# Patient Record
Sex: MALE | Race: WHITE
[De-identification: names, ages, dates, MRNs, and addresses within clinical notes are randomized per-mention and may not be internally consistent; named-entity substitution may affect disease eponyms.]

---

## 2020-02-04 ENCOUNTER — HOSPITAL ENCOUNTER (EMERGENCY)
Dept: HOSPITAL 74 - JERFT | Age: 30
Discharge: HOME | End: 2020-02-04
Payer: COMMERCIAL

## 2020-02-04 VITALS — TEMPERATURE: 97.8 F | SYSTOLIC BLOOD PRESSURE: 148 MMHG | HEART RATE: 51 BPM | DIASTOLIC BLOOD PRESSURE: 86 MMHG

## 2020-02-04 VITALS — BODY MASS INDEX: 36.2 KG/M2

## 2020-02-04 DIAGNOSIS — K08.89: Primary | ICD-10-CM

## 2020-02-04 DIAGNOSIS — K02.9: ICD-10-CM

## 2020-02-04 NOTE — PDOC
Rapid Medical Evaluation


Chief Complaint: Toothache


Time Seen by Provider: 02/04/20 15:02


Medical Evaluation: 


 Allergies











Allergy/AdvReac Type Severity Reaction Status Date / Time


 


No Known Allergies Allergy   Verified 02/04/20 14:59








Vital Signs











Temp Pulse Resp BP Pulse Ox


 


 97.8 F   51 L  14   148/86   98 


 


 02/04/20 15:00  02/04/20 15:00  02/04/20 15:00  02/04/20 15:00  02/04/20 15:00








02/04/20 15:03


Pt c/o: [place] with pain, no meds taken, needs to see a dentist


Pt on brief exam: + caries, no abscess


Pt ordered for: none


pt to proceed to the ED





**Discharge Disposition





- Diagnosis


 Tooth pain








- Discharge Dispostion


Disposition: HOME


Condition at time of disposition: Stable





- Prescriptions


Prescriptions: 


Amoxicillin - [Amoxicillin 500mg Capsule -] 500 mg PO TID #21 capsule


Ibuprofen [Motrin -] 600 mg PO TID #30 tablet


Ibuprofen [Motrin -] 600 mg PO TID #30 tablet





- Referrals


[facility]: 


[facility]





- Patient Instructions


Additional Instructions: 


Without fail follow-up with urgent care dental in 1 to 2 days for further 

evaluation and treatment options.  You may take Tylenol on top of the Motrin 

that I have prescribed for you.  The antibiotic is necessary to prevent 

infection.  Without fail follow-up with urgent care dental in 1 to 2 days and 

return to the emergency room should symptoms worsen.





- Post Discharge Activity

## 2020-02-04 NOTE — PDOC
History of Present Illness





- General


Chief Complaint: Toothache


Stated Complaint: TOOTHACHE


Time Seen by Provider: 02/04/20 15:02





- History of Present Illness


Initial Comments: 





02/04/20 17:09


29-year-old male without comorbidities presents for evaluation of toothache x4 

days





Past History





- Past Medical History


Allergies/Adverse Reactions: 


 Allergies











Allergy/AdvReac Type Severity Reaction Status Date / Time


 


No Known Allergies Allergy   Verified 02/04/20 14:59











Home Medications: 


Ambulatory Orders





Ibuprofen [Motrin -] 600 mg PO TID #30 tablet 02/04/20 


NK [No Known Home Medication]  02/04/20 








COPD: No





- Psycho Social/Smoking Cessation Hx


Smoking History: Never smoked


Have you smoked in the past 12 months: No


Number of Cigarettes Smoked Daily: 0


Hx Alcohol Use: No


Drug/Substance Use Hx: No


Substance Use Type: None





**Review of Systems





- Review of Systems


Constitutional: No: Fever


HEENTM: Yes: Dental Problems





*Physical Exam





- Vital Signs


 Last Vital Signs











Temp Pulse Resp BP Pulse Ox


 


 97.8 F   51 L  14   148/86   98 


 


 02/04/20 15:00  02/04/20 15:00  02/04/20 15:00  02/04/20 15:00  02/04/20 15:00














- Physical Exam





02/04/20 17:09


GENERAL: The patient is awake, alert, and fully oriented, in no acute distress.


HEAD: Normal with no signs of trauma.


EYES:  sclera anicteric, conjunctiva clear.


ENT: Ears normal tympanic membranes normal oropharynx clear uvula midline


NECK: Normal range of motion


LUNGS: Breath sounds equal, clear to auscultation bilaterally.  No wheezes, and 

no crackles.


HEART: S1 and S2 without murmur, rub or gallop.


ABDOMEN: Soft, nontender, normoactive bowel sounds.  No guarding, no rebound.  

No masses.


EXTREMITIES: Normal range of motion, no edema.  No clubbing or cyanosis. No 

cords, erythema, or tenderness.


NEUROLOGICAL: Cranial nerves II through XII grossly intact.


PSYCH: Normal mood, normal affect.


SKIN: Warm, Dry, normal turgor, no rashes or lesions noted.





Widespread dental decay left anterior incisors and premolars are decayed and 

torn down no areas of focal fluctuance





Medical Decision Making





- Medical Decision Making





02/04/20 17:10


Amoxicillin follow-up with urgent care dental





Discharge





- Discharge Information


Problems reviewed: Yes


Clinical Impression/Diagnosis: 


 Tooth pain





Condition: Stable


Disposition: HOME





- Admission


No





- Additional Discharge Information


Prescriptions: 


Amoxicillin - [Amoxicillin 500mg Capsule -] 500 mg PO TID #21 capsule





- Follow up/Referral


Referrals: 


[facility]





- Patient Discharge Instructions


Additional Instructions: 


Without fail follow-up with urgent care dental in 1 to 2 days for further 

evaluation and treatment options.  You may take Tylenol on top of the Motrin 

that I have prescribed for you.  The antibiotic is necessary to prevent 

infection.  Without fail follow-up with urgent care dental in 1 to 2 days and 

return to the emergency room should symptoms worsen.





- Post Discharge Activity

## 2020-02-13 ENCOUNTER — HOSPITAL ENCOUNTER (EMERGENCY)
Dept: HOSPITAL 74 - JER | Age: 30
LOS: 1 days | Discharge: HOME | End: 2020-02-14
Payer: SELF-PAY

## 2020-02-13 VITALS — DIASTOLIC BLOOD PRESSURE: 61 MMHG | TEMPERATURE: 98.1 F | SYSTOLIC BLOOD PRESSURE: 124 MMHG | HEART RATE: 80 BPM

## 2020-02-13 VITALS — BODY MASS INDEX: 36.2 KG/M2

## 2020-02-13 DIAGNOSIS — K02.9: Primary | ICD-10-CM

## 2020-02-14 NOTE — PDOC
History of Present Illness





- General


Chief Complaint: Toothache


Stated Complaint: ABSCESS


Time Seen by Provider: 02/14/20 00:13


History Source: Patient


Exam Limitations: No Limitations





- History of Present Illness


Initial Comments: 





02/14/20 00:45


29-year-old male denies past medical history presents complaining of left upper 

dental pain for the past 4 days.  Seen at this ED on February 4, 2020 for same 

reason, treated with amoxicillin 500 3 times daily x7 days and ibuprofen as 

needed at that time.  Advised to follow-up with a dentist which he has not been 

able to see as of yet.  Denies fever, chills, trauma, headache or any other 

complaint.





ROS: Dental pain





PE:


GENERAL: well-appearing, NAD


HEAD: NCAT


EYES: Pupils equal, round and reactive to light, sclera anicteric, conjunctiva 

clear


ENT: Poor dentition, several missing teeth, teeth #9 through 12 with decay, no 

pus pocket noted, no facial swelling, pharynx: no erythema, no exudate, uvula 

midline


NECK: supple


CHEST: nontender


RESP: clear, no w/r/r


CARDIO: rrr, no m/g/r


ABD: +BS, soft, nontender, non distended


BACK: no midline spinal ttp, no CVAT 


EXTREMITIES: Normal range of motion, no edema


NEUROLOGICAL: Normal speech, normal gait


SKIN: Warm, Dry


02/14/20 00:51





Is this a multiple visit Asthma Patient?: No





Past History





- Past Medical History


Allergies/Adverse Reactions: 


 Allergies











Allergy/AdvReac Type Severity Reaction Status Date / Time


 


No Known Allergies Allergy   Verified 02/13/20 23:19











Home Medications: 


Ambulatory Orders





Amoxicillin - [Amoxicillin 500mg Capsule -] 500 mg PO TID #21 capsule 02/04/20 


Ibuprofen [Motrin -] 600 mg PO TID #30 tablet 02/04/20 


Ibuprofen [Motrin -] 600 mg PO TID #30 tablet 02/04/20 


Amoxicillin - [Amoxicillin 500mg Capsule -] 500 mg PO TID #21 capsule 02/14/20 


Ibuprofen 600 mg PO Q6H #20 tablet 02/14/20 








COPD: No





- Psycho Social/Smoking Cessation Hx


Smoking History: Never smoked


Have you smoked in the past 12 months: No


Number of Cigarettes Smoked Daily: 0


Information on smoking cessation initiated: No


Hx Alcohol Use: No


Drug/Substance Use Hx: Yes


Substance Use Type: None





*Physical Exam





- Vital Signs


 Last Vital Signs











Temp Pulse Resp BP Pulse Ox


 


 98.1 F   80   18   124/61   100 


 


 02/13/20 23:17  02/13/20 23:17  02/13/20 23:17  02/13/20 23:17  02/13/20 23:17














Medical Decision Making





- Medical Decision Making





02/14/20 00:49


29-year-old male denies past medical history presents with poor dentition, 

complaining of dental pain.





Treat with amoxicillin 500 mg 3 times daily x7 days and ibuprofen 600 mg every 

6 as needed


Urged patient to follow-up with a dentist within 1 to 2 days


Return to ED if symptoms worsen





Discharge





- Discharge Information


Problems reviewed: Yes


Clinical Impression/Diagnosis: 


 Pain, dental, Pain due to dental caries





Condition: Stable


Disposition: HOME





- Admission


No





- Additional Discharge Information


Prescriptions: 


Amoxicillin - [Amoxicillin 500mg Capsule -] 500 mg PO TID #21 capsule


Ibuprofen 600 mg PO Q6H #20 tablet





- Follow up/Referral





- Patient Discharge Instructions


Additional Instructions: 


Take amoxicillin 500 mg 3 times a day for 7 days


Take ibuprofen 600 mg every 6 hours as needed for pain


Follow-up with your dentist within 1 to 2 days


Return to ED if symptoms worsen





- Post Discharge Activity

## 2021-02-03 ENCOUNTER — HOSPITAL ENCOUNTER (EMERGENCY)
Dept: HOSPITAL 74 - FER | Age: 31
Discharge: HOME | End: 2021-02-03
Payer: SELF-PAY

## 2021-02-03 VITALS — BODY MASS INDEX: 35.9 KG/M2

## 2021-02-03 VITALS — HEART RATE: 80 BPM | SYSTOLIC BLOOD PRESSURE: 124 MMHG | TEMPERATURE: 98 F | DIASTOLIC BLOOD PRESSURE: 73 MMHG

## 2021-02-03 DIAGNOSIS — M54.5: Primary | ICD-10-CM

## 2021-11-10 ENCOUNTER — HOSPITAL ENCOUNTER (EMERGENCY)
Dept: HOSPITAL 74 - FER | Age: 31
Discharge: HOME | End: 2021-11-10
Payer: SELF-PAY

## 2021-11-10 VITALS — HEART RATE: 74 BPM | DIASTOLIC BLOOD PRESSURE: 76 MMHG | TEMPERATURE: 98.7 F | SYSTOLIC BLOOD PRESSURE: 133 MMHG

## 2021-11-10 VITALS — BODY MASS INDEX: 37.6 KG/M2

## 2021-11-10 DIAGNOSIS — R68.84: ICD-10-CM

## 2021-11-10 DIAGNOSIS — K08.89: ICD-10-CM

## 2021-11-10 DIAGNOSIS — K02.9: Primary | ICD-10-CM
